# Patient Record
Sex: FEMALE | Race: WHITE | NOT HISPANIC OR LATINO | Employment: OTHER | URBAN - METROPOLITAN AREA
[De-identification: names, ages, dates, MRNs, and addresses within clinical notes are randomized per-mention and may not be internally consistent; named-entity substitution may affect disease eponyms.]

---

## 2021-05-05 ENCOUNTER — TRANSFERRED RECORDS (OUTPATIENT)
Dept: HEALTH INFORMATION MANAGEMENT | Facility: CLINIC | Age: 71
End: 2021-05-05

## 2021-05-06 NOTE — TELEPHONE ENCOUNTER
FUTURE VISIT INFORMATION      FUTURE VISIT INFORMATION:    Date: 5/12/21    Time: 9:00am    Location: [facility]  REFERRAL INFORMATION:    Referring provider: Dr. Willa Gaxiola     Referring providers clinic:  [facility]    Reason for visit/diagnosis   scotoma after ERM peel OS     RECORDS REQUESTED FROM:       Clinic name Comments Records Status Imaging Status   [facility] Request for recs sent 5/6- received and seent to scanning EPIC

## 2021-05-12 ENCOUNTER — OFFICE VISIT (OUTPATIENT)
Dept: OPHTHALMOLOGY | Facility: CLINIC | Age: 71
End: 2021-05-12
Payer: COMMERCIAL

## 2021-05-12 ENCOUNTER — PRE VISIT (OUTPATIENT)
Dept: OPHTHALMOLOGY | Facility: CLINIC | Age: 71
End: 2021-05-12

## 2021-05-12 DIAGNOSIS — H53.15 METAMORPHOPSIA: ICD-10-CM

## 2021-05-12 DIAGNOSIS — H53.40 VISUAL FIELD DEFECT: Primary | ICD-10-CM

## 2021-05-12 PROCEDURE — 92083 EXTENDED VISUAL FIELD XM: CPT | Performed by: OPHTHALMOLOGY

## 2021-05-12 PROCEDURE — 92133 CPTRZD OPH DX IMG PST SGM ON: CPT | Performed by: OPHTHALMOLOGY

## 2021-05-12 PROCEDURE — 99203 OFFICE O/P NEW LOW 30 MIN: CPT | Performed by: OPHTHALMOLOGY

## 2021-05-12 ASSESSMENT — REFRACTION_WEARINGRX
OD_AXIS: 167
OS_SPHERE: -0.25
OD_SPHERE: -1.00
OS_CYLINDER: SPHERE
OD_ADD: +2.75
SPECS_TYPE: PAL
OS_ADD: +2.75
OD_CYLINDER: +1.00

## 2021-05-12 ASSESSMENT — VISUAL ACUITY
CORRECTION_TYPE: GLASSES
OS_CC: 20/25
METHOD: SNELLEN - LINEAR
OD_CC: 20/20

## 2021-05-12 ASSESSMENT — SLIT LAMP EXAM - LIDS
COMMENTS: NORMAL
COMMENTS: NORMAL

## 2021-05-12 ASSESSMENT — TONOMETRY
OD_IOP_MMHG: 20
OS_IOP_MMHG: 20
IOP_METHOD: ICARE

## 2021-05-12 ASSESSMENT — EXTERNAL EXAM - RIGHT EYE: OD_EXAM: NORMAL

## 2021-05-12 ASSESSMENT — EXTERNAL EXAM - LEFT EYE: OS_EXAM: NORMAL

## 2021-05-12 ASSESSMENT — CONF VISUAL FIELD
OD_NORMAL: 1
METHOD: COUNTING FINGERS
OS_NORMAL: 1

## 2021-05-12 ASSESSMENT — CUP TO DISC RATIO
OS_RATIO: 0.25
OD_RATIO: 0.25

## 2021-05-12 NOTE — PROGRESS NOTES
Assessment & Plan     Robyn Armendariz is a 70 year old female with the following diagnoses:   1. Visual field defect    2. Metamorphopsia         Patient was sent for consultation by Dr. Willa Gaxiola for scotoma following epiretinal membrane surgery.     HPI:    70 year old woman presented because of metamorphobsia and missing letter when she is reading. It started 2 years ago and it has been progressively getting worse since then. She had ERM peel 3 years ago. She denies any headaches, numbness or weakness in your extremities. She denies sees flashes but she has a floater in the right eye    Independent historians:  Patient    Review of outside testing:  Pseudophakic left eye in 2016  Horseshoe tear sp laser right eye  ERM peel OS    My interpretation performed today of outside testing:  None      Review of outside clinical notes:  Ophthalmology notes    Past medical history:  T2DM hbA1c: 7%  epilepsy    Family history / social history:  Grandfather had glaucoma    Exam:    Her visual acuity is 20/20 in the right and 20/25 in the left eye with no APD. Color plates are full. 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 This is following successful Epiretinal membrane peel. I explained to her that this outcome does occur despite the membrane being peeled completely.  We discussed that her left eye was her better eye in the past and most likely she is unable to focus just using her right eye.  We discussed that it would be beneficial if she could learn to ignore the images from the left eye.  We also discussed that she could consider patching or blurring the vision in the left eye.  She states that she has  been having difficulty reading from the right eye because of her cataract.  She is considering cataract surgery but is very hesitant.  I do not see a visual field defect or an abnormality of the optic nerve.  I believe that her symptoms are all from her retinal changes.              Attending Physician Attestation:  Complete documentation of historical and exam elements from today's encounter can be found in the full encounter summary report (not reduplicated in this progress note).  I personally obtained the chief complaint(s) and history of present illness.  I confirmed and edited as necessary the review of systems, past medical/surgical history, family history, social history, and examination findings as documented by others; and I examined the patient myself.  I personally reviewed the relevant tests, images, and reports as documented above.  I formulated and edited as necessary the assessment and plan and discussed the findings and management plan with the patient and family. I personally reviewed the ophthalmic test(s) associated with this encounter, agree with the interpretation(s) as documented by the resident/fellow, and have edited the corresponding report(s) as necessary.  - Jovan Argueta MD  Neuro-ophthalmology fellow  [facility]

## 2021-05-12 NOTE — Clinical Note
5/12/2021       RE: Robyn Armendariz  [address]  [facility] [id number]     Dear Colleague,    Thank you for referring your patient, Robyn Armendariz, to the [facility] [place] at [facility]. Please see a copy of my visit note below.         Assessment & Plan     Robyn Armendariz is a 70 year old female with the following diagnoses:   1. Visual field defect    2. Metamorphopsia         Patient was sent for consultation by Dr. Willa Gaxiola for scotoma following epiretinal membrane surgery.     HPI:    70 year old woman presented because of metamorphobsia and missing letter when she is reading. It started 2 years ago and it has been progressively getting worse since then. She had ERM peel 3 years ago. She denies any headaches, numbness or weakness in your extremities. She denies sees flashes but she has a floater in the right eye    Independent historians:  Patient    Review of outside testing:  Pseudophakic left eye in 2016  Horseshoe tear sp laser right eye  ERM peel OS    My interpretation performed today of outside testing:  None      Review of outside clinical notes:  Ophthalmology notes    Past medical history:  T2DM hbA1c: 7%  epilepsy    Family history / social history:  Grandfather had glaucoma    Exam:    Her visual acuity is 20/20 in the right and 20/25 in the left eye with no APD. Color plates are full. 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 This is following successful Epiretinal membrane peel. I explained to her that this outcome does occur despite the membrane being peeled completely.  We discussed that her  left eye was her better eye in the past and most likely she is unable to focus just using her right eye.  We discussed that it would be beneficial if she could learn to ignore the images from the left eye.  We also discussed that she could consider patching or blurring the vision in the left eye.  She states that she has been having difficulty reading from the right eye because of her cataract.  She is considering cataract surgery but is very hesitant.  I do not see a visual field defect or an abnormality of the optic nerve.  I believe that her symptoms are all from her retinal changes.              Attending Physician Attestation:  Complete documentation of historical and exam elements from today's encounter can be found in the full encounter summary report (not reduplicated in this progress note).  I personally obtained the chief complaint(s) and history of present illness.  I confirmed and edited as necessary the review of systems, past medical/surgical history, family history, social history, and examination findings as documented by others; and I examined the patient myself.  I personally reviewed the relevant tests, images, and reports as documented above.  I formulated and edited as necessary the assessment and plan and discussed the findings and management plan with the patient and family. I personally reviewed the ophthalmic test(s) associated with this encounter, agree with the interpretation(s) as documented by the resident/fellow, and have edited the corresponding report(s) as necessary.  - Jovan Argueta MD  Neuro-ophthalmology fellow  [facility]              Again, thank you for allowing me to participate in the care of your patient.      Sincerely,    Jovan Mejia MD

## 2021-05-12 NOTE — LETTER
2021         RE:  :  MRN: Robyn Armendariz  1950  [id number]     Dear Dr. Gaxiola,    Thank you for asking me to see your very pleasant patient, Robyn Armendariz, in neuro-ophthalmic consultation.  I would like to thank you for sending your records and I have summarized them in the history of present illness.  My assessment and plan are below.  For further details, please see my attached clinic note.      Assessment & Plan     Robyn Armendariz is a 70 year old female with the following diagnoses:   1. Visual field defect    2. Metamorphopsia         Patient was sent for consultation by Dr. Willa Gaxiola for scotoma following epiretinal membrane surgery.     HPI:  70 year old woman presented because of metamorphobsia and missing letter when she is reading. It started 2 years ago and it has been progressively getting worse since then. She had ERM peel 3 years ago. She denies any headaches, numbness or weakness in your extremities. She denies sees flashes but she has a floater in the right eye    Independent historians: Patient    Review of outside testing:  Pseudophakic left eye in 2016  Horseshoe tear sp laser right eye  ERM peel OS    My interpretation performed today of outside testing: None    Review of outside clinical notes: Ophthalmology notes    Past medical history:  T2DM hbA1c: 7%  epilepsy    Family history / social history: Grandfather had glaucoma    Exam:  Her visual acuity is 20/20 in the right and 20/25 in the left eye with no APD. Color plates are full. 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 This is following successful Epiretinal membrane peel. I explained to her that this  outcome does occur despite the membrane being peeled completely.  We discussed that her left eye was her better eye in the past and most likely she is unable to focus just using her right eye.  We discussed that it would be beneficial if she could learn to ignore the images from the left eye.  We also discussed that she could consider patching or blurring the vision in the left eye.  She states that she has been having difficulty reading from the right eye because of her cataract.  She is considering cataract surgery but is very hesitant.  I do not see a visual field defect or an abnormality of the optic nerve.  I believe that her symptoms are all from her retinal changes.       Again, thank you for allowing me to participate in the care of your patient.      Sincerely,    Jovan Mejia MD  Professor  Ophthalmology Residency   Director of Neuro-Ophthalmology  [employer] Endowed Chair  Departments of Ophthalmology, Neurology, and Neurosurgery  [facility] [address] [place] [address]  [id number]  T - [phone]  F - [phone]  [email or web address]    CC: Willa Gaxiola MD  [facility]  [address]  [place] [address]  Via Fax: [phone]     Roseanna Wilhelm [facility]  [address] [place] [address] [address]  [place] [address]  Via Fax: [phone]